# Patient Record
Sex: MALE | Race: WHITE | URBAN - NONMETROPOLITAN AREA
[De-identification: names, ages, dates, MRNs, and addresses within clinical notes are randomized per-mention and may not be internally consistent; named-entity substitution may affect disease eponyms.]

---

## 2020-06-15 ENCOUNTER — TELEPHONE (OUTPATIENT)
Dept: FAMILY MEDICINE CLINIC | Age: 40
End: 2020-06-15

## 2020-06-17 NOTE — PROGRESS NOTES
Outpatient Medications   Medication Sig Dispense Refill    ciprofloxacin (CIPRO) 500 MG tablet take 1 tablet by mouth twice a day      metroNIDAZOLE (FLAGYL) 500 MG tablet take 1 tablet by mouth twice a day for 7 days      docusate sodium (COLACE) 100 MG capsule Take 100 mg by mouth 2 times daily OTC      ibuprofen (ADVIL;MOTRIN) 200 MG CAPS Take 1 capsule by mouth as needed for Fever OTC       No current facility-administered medications for this visit. No orders of the defined types were placed in this encounter. All medications reviewed and reconciled, including OTC and herbal medications. Updated list given to patient. There are no active problems to display for this patient. Past Medical History:   Diagnosis Date    DDD (degenerative disc disease), lumbar          Past Surgical History:   Procedure Laterality Date    BACK SURGERY  2004    L4 Laminectomy. Dr. Norma Carcamo         No Known Allergies      Social History     Tobacco Use    Smoking status: Never Smoker    Smokeless tobacco: Never Used   Substance Use Topics    Alcohol use: Yes     Frequency: Monthly or less         Family History   Problem Relation Age of Onset    Diabetes Father     Colon Cancer Neg Hx     Prostate Cancer Neg Hx          I have reviewed the patient's past medical history, past surgical history, allergies, medications, social and family history and I have made updates where appropriate.       Review of Systems  Positive responses are highlighted in bold    Constitutional:  Fever, Chills, Night Sweats, Fatigue, Unexpected changes in weight  Eyes:  Eye discharge, Eye pain, Eye redness, Visual disturbances   HENT:  Ear pain, Tinnitus, Nosebleeds, Trouble swallowing, Hearing loss, Sore throat  Cardiovascular:  Chest Pain, Palpitations, Orthopnea, Paroxysmal Nocturnal Dyspnea  Respiratory:  Cough, Wheezing, Shortness of breath, Chest tightness, Apnea  Gastrointestinal:  Nausea, Vomiting, Diarrhea, Constipation, or gross deformity   Neuro:  Alert, 5/5 strength globally and symmetrically, 2+ patellar reflexes bilaterally  Psych: Affect appropriate. Mood normal. Thought process is normal without evidence of depression or psychosis. Good insight and appropriate interaction. Cognition and memory appear to be intact.   Skin: warm and dry, no rash or erythema  Lymph:  No cervical, auricular or supraclavicular lymph nodes palpated      Orders Only on 06/15/2020   Component Date Value Ref Range Status    Color, UA 06/15/2020 Straw   Final    Appearance 06/15/2020 Clear   Final    Glucose, Ur 06/15/2020 Negative  Negative Final    Bilirubin Urine 06/15/2020 Negative  Negative Final    Ketones, Urine 06/15/2020 Negative  Negative Final    Specific Gravity, Urine 06/15/2020 1.025  1.000 - 1.03 Final    Urine Hgb 06/15/2020 Negative  Negative Final    pH, Urine 06/15/2020 7.0  5.0 - 8.0 Final    Protein, Urine 06/15/2020 Negative  Negative Final    Urobilinogen, Urine 06/15/2020 <2.0 E.U./dL  0.2 - 1.0 Final    Nitrite, Urine 06/15/2020 Negative  Negative Final    LEUKOCYTES, UA 06/15/2020 Negative  Negative Final    WBC, UA 06/15/2020 0-5  /HPF Final    RBC, UA 06/15/2020 None  /HPF Final    Squam Epithel, UA 06/15/2020 None  /HPF Final    URINALYSIS REFLEX 06/15/2020 No   Final    Blood Type 06/15/2020 B Negative   Final    Ab Scrn 06/15/2020 NEGATIVE   Final    Lipase 06/15/2020 35  21 - 51 IU/L Final    Sodium 06/15/2020 140  135 - 145 mEq/L Final    Potassium 06/15/2020 3.5* 3.6 - 5.0 mEq/L Final    Chloride 06/15/2020 102  101 - 111 mEq/L Final    CO2 06/15/2020 27  21 - 32 mEq/L Final    Anion Gap 06/15/2020 11  4 - 12 Final    Glucose 06/15/2020 96  70 - 110 mg/dL Final    BUN 06/15/2020 14  7 - 20 mg/dL Final    CREATININE 06/15/2020 1.13  0.60 - 1.30 mg/dL Final    Creatinine Clearance 06/15/2020 >60   Final    AST 06/15/2020 16  15 - 41 IU/L Final    Alkaline Phosphatase 06/15/2020 55  41 - 137 IU/L Final    Total Bilirubin 06/15/2020 0.7  0.2 - 1.0 mg/dL Final    Calcium 06/15/2020 9.20  8.8 - 10.5 mg/dL Final    Alb 06/15/2020 4.3  3.5 - 5.0 gm/dL Final    Total Protein 06/15/2020 7.2  6.2 - 8.0 g/dL Final    Albumin/Globulin Ratio 06/15/2020 1.5  1.5 - 2.5 Final    ALT 06/15/2020 24  10 - 40 IU/L Final    Protime 06/15/2020 12.3  9.6 - 13.3 Sec Final    INR 06/15/2020 1.07  0.9 - 1.2 Final    aPTT 06/15/2020 31.0  23.0 - 38.0 Sec Final    WBC 06/15/2020 8.4  4.4 - 10.5 th/cmm Final    RBC 06/15/2020 5.33  4.50 - 6.00 mil/cmm Final    Hemoglobin 06/15/2020 15.3  13.5 - 16.5 gm/dL Final    Hematocrit 06/15/2020 44.9  40.0 - 49.0 % Final    MCV 06/15/2020 84.2  80 - 97 CU SUZY Final    MCH 06/15/2020 28.7  27.5 - 33.0 PG Final    MCHC 06/15/2020 34.1  33.0 - 36.0 gm/dL Final    RDW 06/15/2020 13.9  12.0 - 16.0 % Final    Platelets  280  150 - 400 th/cmm Final    Neutrophils % 06/15/2020 64.4  40 - 70 % Final    Lymphocytes % 06/15/2020 25.3  15 - 45 % Final    Monocytes % 06/15/2020 9.1  2 - 10 % Final    Eosinophils % 06/15/2020 0.9  0 - 6 % Final    Basophils % 06/15/2020 0.3  0 - 2 % Final    Nucleated RBCs 06/15/2020 0.1  <1 /100 WBC Final    Absolute Neut # 06/15/2020 5400  1800 - 7700 /cmm Final    Absolute Lymph # 06/15/2020 2100  1000 - 4800 /cmm Final    Absolute Mono # 06/15/2020 800  0 - 800 /cmm Final    Absolute Eos # 06/15/2020 100  0 - 500 /cmm Final    Absolute Baso # 06/15/2020 0  0 - 200 /cmm Final       Narrative   Ordering Provider: [address] [place] [address]          Radiology Department  Patient:  JAYLA SHORT 81.   :  1980   Sex: [facility]   [place] [facility] [place]     [phone]   Unit #:   [id number]       Acct #: [de-identified]       Ordering Phys: Anitra Silva PA-C            Exam Date: 06/15/20     Accession #:  [id number]     Exam:  CT   CT Abd/Pelvis W/Contrast [id number]     Result: See  Hepatic steatosis.     3.  Hepatic hypodensity, too small to characterize.     4.  Old granulomatous disease.          Electronically Signed:     Krishna Martinez MD     2020/06/15 at 18:22 EDT     Tel [phone], Service support  [phone], Fax [phone]                         cc: Wilfred Valadez PA-C; Katelyn Zhu DO               Dictated by: Andria Boo MD on 06/15/20 [employer]     Technologist: Xavi Sanon RT(R)(CT)(ARRT)     Transcribed by: Andria Boo MD on 06/15/20 1822          Report Signed by: Dax Gallardo MD,Ailin MULLIGAN on 06/15/20 1822         ASSESSMENT & PLAN  1. Visit for preventive health examination    Vaccines reviewed and update recommendations made  Routine labs ordered  F/u annually and prn    - Basic Metabolic Panel; Future  - Lipid Panel; Future    2. Colitis    Improving  Finish ATB  F/u GI  Reviewed ER precautions, pt understands. Near syncope likely vasovagal. Never happened before or since  Monitor for recurrence      DISPOSITION    Return in about 1 year (around 6/19/2021) for routine well visit, sooner as needed. Bell Bro released without restrictions. PATIENT COUNSELING    Counseling was provided today regarding the following topics: Healthy eating habits, Regular exercise, substance abuse and healthy sleep habits. Bell Bro received counseling on the following healthy behaviors: nutrition, exercise and medication adherence    Patient given educational materials on: See Attached    I have instructed Bell Bro to complete a self tracking handout on We[employer] and instructed them to bring it with them to his next appointment. Barriers to learning and self management: none    Discussed use, benefit, and side effects of prescribed medications. Barriers to medication compliance addressed. All patient questions answered. Pt voiced understanding.        Electronically signed by Katelyn Zhu DO on 6/19/2020 at 11:21 AM

## 2020-06-19 ENCOUNTER — OFFICE VISIT (OUTPATIENT)
Dept: FAMILY MEDICINE CLINIC | Age: 40
End: 2020-06-19
Payer: COMMERCIAL

## 2020-06-19 VITALS
SYSTOLIC BLOOD PRESSURE: 118 MMHG | DIASTOLIC BLOOD PRESSURE: 78 MMHG | TEMPERATURE: 97.8 F | HEART RATE: 81 BPM | WEIGHT: 200 LBS | HEIGHT: 70 IN | RESPIRATION RATE: 10 BRPM | BODY MASS INDEX: 28.63 KG/M2 | OXYGEN SATURATION: 97 %

## 2020-06-19 PROCEDURE — 99386 PREV VISIT NEW AGE 40-64: CPT | Performed by: FAMILY MEDICINE

## 2020-06-19 RX ORDER — METRONIDAZOLE 500 MG/1
TABLET ORAL
COMMUNITY
Start: 2020-06-15 | End: 2020-08-25 | Stop reason: SDUPTHER

## 2020-06-19 RX ORDER — OMEGA-3 FATTY ACIDS/FISH OIL 300-1000MG
1 CAPSULE ORAL PRN
COMMUNITY

## 2020-06-19 RX ORDER — CIPROFLOXACIN 500 MG/1
TABLET, FILM COATED ORAL
COMMUNITY
Start: 2020-06-15 | End: 2020-08-25 | Stop reason: SDUPTHER

## 2020-06-19 RX ORDER — DOCUSATE SODIUM 100 MG/1
100 CAPSULE, LIQUID FILLED ORAL 2 TIMES DAILY
COMMUNITY

## 2020-06-19 SDOH — HEALTH STABILITY: MENTAL HEALTH: HOW OFTEN DO YOU HAVE A DRINK CONTAINING ALCOHOL?: MONTHLY OR LESS

## 2020-06-19 ASSESSMENT — PATIENT HEALTH QUESTIONNAIRE - PHQ9
1. LITTLE INTEREST OR PLEASURE IN DOING THINGS: 0
2. FEELING DOWN, DEPRESSED OR HOPELESS: 0
SUM OF ALL RESPONSES TO PHQ QUESTIONS 1-9: 0
SUM OF ALL RESPONSES TO PHQ QUESTIONS 1-9: 0
SUM OF ALL RESPONSES TO PHQ9 QUESTIONS 1 & 2: 0

## 2020-06-25 ENCOUNTER — TELEPHONE (OUTPATIENT)
Dept: FAMILY MEDICINE CLINIC | Age: 40
End: 2020-06-25

## 2020-06-26 ENCOUNTER — TELEPHONE (OUTPATIENT)
Dept: FAMILY MEDICINE CLINIC | Age: 40
End: 2020-06-26

## 2020-08-25 ENCOUNTER — OFFICE VISIT (OUTPATIENT)
Dept: FAMILY MEDICINE CLINIC | Age: 40
End: 2020-08-25
Payer: COMMERCIAL

## 2020-08-25 ENCOUNTER — PATIENT MESSAGE (OUTPATIENT)
Dept: FAMILY MEDICINE CLINIC | Age: 40
End: 2020-08-25

## 2020-08-25 VITALS
HEART RATE: 78 BPM | SYSTOLIC BLOOD PRESSURE: 136 MMHG | OXYGEN SATURATION: 98 % | WEIGHT: 196 LBS | RESPIRATION RATE: 10 BRPM | BODY MASS INDEX: 28.06 KG/M2 | HEIGHT: 70 IN | TEMPERATURE: 98.3 F | DIASTOLIC BLOOD PRESSURE: 70 MMHG

## 2020-08-25 PROCEDURE — 99214 OFFICE O/P EST MOD 30 MIN: CPT | Performed by: FAMILY MEDICINE

## 2020-08-25 RX ORDER — CIPROFLOXACIN 500 MG/1
500 TABLET, FILM COATED ORAL 2 TIMES DAILY
Qty: 20 TABLET | Refills: 0 | Status: SHIPPED | OUTPATIENT
Start: 2020-08-25 | End: 2020-09-04

## 2020-08-25 RX ORDER — METRONIDAZOLE 500 MG/1
500 TABLET ORAL 2 TIMES DAILY
Qty: 20 TABLET | Refills: 0 | Status: SHIPPED | OUTPATIENT
Start: 2020-08-25 | End: 2020-09-04

## 2020-08-25 NOTE — PATIENT INSTRUCTIONS
Patient Education        Colitis: Care Instructions  Your Care Instructions  Colitis is the medical term for swelling (inflammation) of the intestine. It can be caused by different things, such as an infection or loss of blood flow in the intestine. Other causes are problems like Crohn's disease or ulcerative colitis. Symptoms may include fever, diarrhea that may be bloody, or belly pain. Sometimes symptoms go away without treatment. But you may need treatment or more tests, such as blood tests or a stool test. Or you may need imaging tests like a CT scan or a colonoscopy. In some cases, the doctor may want to test a sample of tissue from the intestine. This test is called a biopsy. The doctor has checked you carefully, but problems can develop later. If you notice any problems or new symptoms, get medical treatment right away. Follow-up care is a key part of your treatment and safety. Be sure to make and go to all appointments, and call your doctor if you are having problems. It's also a good idea to know your test results and keep a list of the medicines you take. How can you care for yourself at home? · Rest until you feel better. · Your doctor may recommend that you eat bland foods. These include rice, dry toast or crackers, bananas, and applesauce. · To prevent dehydration, drink plenty of fluids. Choose water and other caffeine-free clear liquids until you feel better. If you have kidney, heart, or liver disease and have to limit fluids, talk with your doctor before you increase the amount of fluids you drink. · Be safe with medicines. Take your medicines exactly as prescribed. Call your doctor if you think you are having a problem with your medicine. You will get more details on the specific medicines your doctor prescribes. When should you call for help? [id number] anytime you think you may need emergency care. For example, call if:  · You passed out (lost consciousness).   · Your stools are maroon or very bloody. Call your doctor now or seek immediate medical care if:  · You have new or worse belly pain. · You have a fever. · You are vomiting. · You cannot pass stools or gas. · You have new or more blood in your stools. Watch closely for changes in your health, and be sure to contact your doctor if:  · You have new or worse symptoms. · You are losing weight. · You do not get better as expected. Where can you learn more? Go to [email or web address] and sign in to your [employer] account. Enter S411 in the [facility] box to learn more about \"Colitis: Care Instructions. \"     If you do not have an account, please click on the \"Sign Up Now\" link. Current as of: August 12, 2019               Content Version: 12.5  © 6286-3113 Healthwise, Incorporated. Care instructions adapted under license by [facility] ([facility]). If you have questions about a medical condition or this instruction, always ask your healthcare professional. [address] any warranty or liability for your use of this information.

## 2020-08-25 NOTE — PROGRESS NOTES
Chief Complaint   Patient presents with    Abdominal Pain     cramping started 2 days ago - still has bloody stools  and  watery loose stools        History obtained from the patient. SUBJECTIVE:  Nadeen Hunt is a 38 y.o. male that presents today for       -GI issues LAST VISIT:  Last weekend had severe abd pain  Was on the toilet, and pain was so bad when trying to have a BM and almost passed out  Didn't last long  Did not fall  Was then able to have a BM, had blood diarrhea  Did not seek care at that time. Continued to feel bloated, dull lower abd pain, and blood stool  Seen in ER Monday, [address]  Had labs/ct  Dx collitis  Placed on cipro/flagyl and sent home  Has had several episodes of abd pain over the last 8 years. Never had blood stools. No fam hx of UC or Chron's dz  Saw GI yesterday. Fay scheduled for July  Is overall feeling better  No further blood in stool  abd pain improving    UPDATE TODAY:   Did fine with no sxs until 2 days ago  Started with mid to lower abd, cramping and some bloody stools  C/w previous colitis    Saw GI in July with neg colo then other than some polyps     No recent ATB use or travel    Pain not as bad as when in ER in June   No fevers  No N/V  Missed work today  Pain is crampy, mid to lower abd, worse in LLQ  Better with rest, worse if eats  4/10 at it's worst      Age/Gender Health Maintenance    Lipid -   No results found for: CHOL  No results found for: TRIG  No results found for: HDL  No results found for: LDLCHOLESTEROL, LDLCALC  No results found for: LABVLDL, VLDL  No results found for: [facility]    DM Screen -   Lab Results   Component Value Date    GLUCOSE 96 06/15/2020       Colon Cancer Screening - + Polyps July 2020, repeat 3 years per GI, Areli Ramos.   Lung Cancer Screening (Age 54 to [de-identified] with 30 pack year hx, current smoker or quit within past 15 years) - n/a    Tetanus - UTD July 2018  Influenza Vaccine - Candidate FALL 2020  Pneumonia Vaccine - age 72  Zoster - age 48     PSA testing discussion - age 54  AAA Screening - age 72 if meets criteria    Falls screening - n/a      Current Outpatient Medications   Medication Sig Dispense Refill    ciprofloxacin (CIPRO) 500 MG tablet Take 1 tablet by mouth 2 times daily for 10 days 20 tablet 0    metroNIDAZOLE (FLAGYL) 500 MG tablet Take 1 tablet by mouth 2 times daily for 10 days 20 tablet 0    docusate sodium (COLACE) 100 MG capsule Take 100 mg by mouth 2 times daily OTC      ibuprofen (ADVIL;MOTRIN) 200 MG CAPS Take 1 capsule by mouth as needed for Fever OTC       No current facility-administered medications for this visit. Orders Placed This Encounter   Medications    ciprofloxacin (CIPRO) 500 MG tablet     Sig: Take 1 tablet by mouth 2 times daily for 10 days     Dispense:  20 tablet     Refill:  0    metroNIDAZOLE (FLAGYL) 500 MG tablet     Sig: Take 1 tablet by mouth 2 times daily for 10 days     Dispense:  20 tablet     Refill:  0       All medications reviewed and reconciled, including OTC and herbal medications. Updated list given to patient. There are no active problems to display for this patient. Past Medical History:   Diagnosis Date    DDD (degenerative disc disease), lumbar          Past Surgical History:   Procedure Laterality Date    BACK SURGERY  2004    L4 Laminectomy. Dr. Marc Chavez         No Known Allergies      Social History     Tobacco Use    Smoking status: Never Smoker    Smokeless tobacco: Never Used   Substance Use Topics    Alcohol use: Yes     Frequency: Monthly or less         Family History   Problem Relation Age of Onset    Diabetes Father     Colon Cancer Neg Hx     Prostate Cancer Neg Hx          I have reviewed the patient's past medical history, past surgical history, allergies, medications, social and family history and I have made updates where appropriate.       Review of Systems  Positive responses are highlighted in bold    Constitutional:  Fever, Chills, Night Sweats, Fatigue, Unexpected changes in weight  HENT:  Ear pain, Tinnitus, Nosebleeds, Trouble swallowing, Hearing loss, Sore throat  Cardiovascular:  Chest Pain, Palpitations, Orthopnea, Paroxysmal Nocturnal Dyspnea  Respiratory:  Cough, Wheezing, Shortness of breath, Chest tightness, Apnea  Gastrointestinal:  Nausea, Vomiting, Diarrhea, Constipation, Heartburn, Blood in stool  Genitourinary:  Difficulty or painful urination, Flank pain, Change in frequency, Urgency  Skin:  Color change, Rash, Itching, Wound  Musculoskeletal:  Joint pain, Back pain, Gait problems, Joint swelling, Myalgias  Neurological:  Dizziness, Headaches, Presyncope, Numbness, Seizures, Tremors  Endocrine:  Heat Intolerance, Cold Intolerance, Polydipsia, Polyphagia, Polyuria      PHYSICAL EXAM:  Vitals:    08/25/20 1513   BP: 136/70   Pulse: 78   Resp: 10   Temp: 98.3 °F (36.8 °C)   TempSrc: Oral   SpO2: 98%   Weight: 196 lb (88.9 kg)   Height: 5' 9.5\" (1.765 m)     Body mass index is 28.53 kg/m². Pain Score:   3(abdomen pain)    VS Reviewed  General Appearance: A&O x 3, No acute distress,well developed and well- nourished  Eyes: pupils equal, round, and reactive to light, extraocular eye movements intact, conjunctivae and eye lids without erythema  ENT: external ear and ear canal clear bilaterally, TMs intact and regular, nose without deformity, nasal mucosa and turbinates normal without polyps, oropharynx normal, dentition is normal for age  Neck: supple and non-tender without mass, no thyromegaly or thyroid nodules, no cervical lymphadenopathy  Pulmonary/Chest: clear to auscultation bilaterally- no wheezes, rales or rhonchi, normal air movement, no respiratory distress or retractions  Cardiovascular: S1 and S2 auscultated w/ RRR. No murmurs, rubs, clicks, or gallops, distal pulses intact. Abdomen: soft, TTP lower abdomen, worse in LLD w/o guarding, rebound or rigidity.  Non-distended, bowel sounds physiologic,  no rebound or guarding, no masses or hernias noted. Liver and spleen without enlargement. Extremities: no cyanosis, clubbing or edema of the lower extremities. Skin: warm and dry, no rash or erythema      ASSESSMENT & PLAN  1. Colitis    VSS  Exam reassuring  Colitis vs diverticulitis  No need for acute imaging at this point    Plan:  Fluids, rest, otc pepto  Add cipro/flagyl  F/u with GI next few wks to reassess  Off work until Friday  F/u if no better  Reviewed ER precautions, pt understands. - ciprofloxacin (CIPRO) 500 MG tablet; Take 1 tablet by mouth 2 times daily for 10 days  Dispense: 20 tablet; Refill: 0  - metroNIDAZOLE (FLAGYL) 500 MG tablet; Take 1 tablet by mouth 2 times daily for 10 days  Dispense: 20 tablet; Refill: 0      DISPOSITION    Return if symptoms worsen or fail to improve. Simin Arevalo released with work restrictions    PATIENT COUNSELING    Patient given educational materials on: See Attached    Barriers to learning and self management: none    Discussed use, benefit, and side effects of prescribed medications. Barriers to medication compliance addressed. All patient questions answered. Pt voiced understanding.        Electronically signed by Chan Hunter DO on 8/25/2020 at 3:28 PM

## 2021-04-01 ENCOUNTER — TELEPHONE (OUTPATIENT)
Dept: FAMILY MEDICINE CLINIC | Age: 41
End: 2021-04-01

## 2021-04-01 NOTE — TELEPHONE ENCOUNTER
2nd attempt to contact the pt re:[facility] labs Dr Madhavi Braga ordered on 6/19/20. HIPAA form is up to date, orders mailed.

## 2021-05-01 ENCOUNTER — NURSE ONLY (OUTPATIENT)
Dept: LAB | Age: 41
End: 2021-05-01

## 2021-05-01 DIAGNOSIS — Z00.00 VISIT FOR PREVENTIVE HEALTH EXAMINATION: ICD-10-CM

## 2021-05-01 LAB
ANION GAP SERPL CALCULATED.3IONS-SCNC: 9 MEQ/L (ref 8–16)
BUN BLDV-MCNC: 10 MG/DL (ref 7–22)
CALCIUM SERPL-MCNC: 9 MG/DL (ref 8.5–10.5)
CHLORIDE BLD-SCNC: 107 MEQ/L (ref 98–111)
CHOLESTEROL, TOTAL: 135 MG/DL (ref 100–199)
CO2: 27 MEQ/L (ref 23–33)
CREAT SERPL-MCNC: 1.1 MG/DL (ref 0.4–1.2)
GFR SERPL CREATININE-BSD FRML MDRD: 73 ML/MIN/1.73M2
GLUCOSE BLD-MCNC: 92 MG/DL (ref 70–108)
HDLC SERPL-MCNC: 41 MG/DL
LDL CHOLESTEROL CALCULATED: 80 MG/DL
POTASSIUM SERPL-SCNC: 4.6 MEQ/L (ref 3.5–5.2)
SODIUM BLD-SCNC: 143 MEQ/L (ref 135–145)
TRIGL SERPL-MCNC: 69 MG/DL (ref 0–199)

## 2021-05-03 ENCOUNTER — TELEPHONE (OUTPATIENT)
Dept: FAMILY MEDICINE CLINIC | Age: 41
End: 2021-05-03

## 2021-05-03 NOTE — TELEPHONE ENCOUNTER
----- Message from Hua Dempsey DO sent at 5/2/2021 10:50 AM EDT -----  Please let pt know that labs are stable and appropriate. Lipids look good. No concerning findings. Let me know if questions, thanks!

## 2022-01-27 ENCOUNTER — VIRTUAL VISIT (OUTPATIENT)
Dept: FAMILY MEDICINE CLINIC | Age: 42
End: 2022-01-27
Payer: COMMERCIAL

## 2022-01-27 VITALS — RESPIRATION RATE: 12 BRPM

## 2022-01-27 DIAGNOSIS — U07.1 COVID-19: Primary | ICD-10-CM

## 2022-01-27 PROCEDURE — 99213 OFFICE O/P EST LOW 20 MIN: CPT | Performed by: FAMILY MEDICINE

## 2022-01-27 RX ORDER — MULTIVIT WITH MINERALS/LUTEIN
1000 TABLET ORAL DAILY
Qty: 30 TABLET | Refills: 0 | Status: SHIPPED | OUTPATIENT
Start: 2022-01-27 | End: 2022-02-26

## 2022-01-27 RX ORDER — CHOLECALCIFEROL (VITAMIN D3) 50 MCG
2000 TABLET ORAL DAILY
Qty: 30 TABLET | Refills: 0 | Status: SHIPPED | OUTPATIENT
Start: 2022-01-27 | End: 2022-02-26

## 2022-01-27 NOTE — PROGRESS NOTES
Chief Complaint   Patient presents with    Positive For Covid-19       TELEHEALTH EVALUATION -- Audio/Visual (During GLCSB-56 public health emergency)    Due to COVID 19 outbreak, patient's office visit was converted to a virtual visit. Patient (and/or legal guardian) was contacted and agreed to proceed with a virtual visit via [email or web address] me  The risks and benefits of converting to a virtual visit were discussed in light of the current infectious disease epidemic. Patient (and/or legal guardian) also understood that insurance coverage and co-pays are up to their individual insurance plans. Services were provided through a video synchronous discussion virtually to substitute for in-person clinic visit    Location of patient: home  Location of physician: office    Identification confirmed by: Patient : 1980    Nathen Pineda, was evaluated through a synchronous (real-time) audio-video encounter. The patient (or guardian if applicable) is aware that this is a billable service, which includes applicable co-pays. This Virtual Visit was conducted with patient's (and/or legal guardian's) consent. The visit was conducted pursuant to the emergency declaration under the [facility], [address] [place] [address] waiver authority and the [employer] and [employer] Act. Patient identification was verified, and a caregiver was present when appropriate. The patient was located in a state where the provider was licensed to provide care. he patient (and/or legal guardian) has also been advised to contact this office for worsening conditions or problems, and seek emergency medical treatment and/or call 911 if deemed necessary. Services were provided through a video synchronous discussion virtually to substitute for in-person clinic visit. Due to this being a TeleHealth encounter, evaluation of certain organ systems is limited.       History obtained from the patient. SUBJECTIVE:  Jose A Mera is a 36 y.o. male that presents today for       -COVID f/u:  sxs started: 1/26  Dx: 1/27 with rapid test  Current sxs: mild cough, nasal drainage, fever 101.2 at tmax, down now. Has a headache, body aches and chills. No SOB. Unvaccinated. He thinks may have had covid before, but never was tested. Age/Gender Health Maintenance    Lipid -   Lab Results   Component Value Date    CHOL 135 05/01/2021     Lab Results   Component Value Date    TRIG 69 05/01/2021     Lab Results   Component Value Date    HDL 41 05/01/2021     Lab Results   Component Value Date    LDLCALC 80 05/01/2021     No results found for: LABVLDL, VLDL  No results found for: CHOLHDLRATIO    DM Screen -   Lab Results   Component Value Date    GLUCOSE 92 05/01/2021    GLUCOSE 96 06/15/2020       Colon Cancer Screening - + Polyps July 2020, repeat 3 years per GI, Atif Casas. Lung Cancer Screening (Age 54 to [de-identified] with 27 pack year hx, current smoker or quit within past 15 years) - n/a    Tetanus - UTD July 2018  Influenza Vaccine - Candidate FALL 2021  Pneumonia Vaccine - age 72  Zoster - age 47     PSA testing discussion - age 53  AAA Screening - age 72 if meets criteria    Falls screening - n/a      Current Outpatient Medications   Medication Sig Dispense Refill    Ascorbic Acid (VITAMIN C) 1000 MG tablet Take 1 tablet by mouth daily 30 tablet 0    vitamin D (CHOLECALCIFEROL) 50 MCG (2000 UT) TABS tablet Take 1 tablet by mouth daily 30 tablet 0    zinc 50 MG CAPS Take 50 mg by mouth daily 30 capsule 0    docusate sodium (COLACE) 100 MG capsule Take 100 mg by mouth 2 times daily OTC      ibuprofen (ADVIL;MOTRIN) 200 MG CAPS Take 1 capsule by mouth as needed for Fever OTC       No current facility-administered medications for this visit.      Orders Placed This Encounter   Medications    Ascorbic Acid (VITAMIN C) 1000 MG tablet     Sig: Take 1 tablet by mouth daily     Dispense:  30 tablet     Refill:  0  vitamin D (CHOLECALCIFEROL) 50 MCG (2000 UT) TABS tablet     Sig: Take 1 tablet by mouth daily     Dispense:  30 tablet     Refill:  0    zinc 50 MG CAPS     Sig: Take 50 mg by mouth daily     Dispense:  30 capsule     Refill:  0       All medications reviewed and reconciled, including OTC and herbal medications. Updated list given to patient. There are no problems to display for this patient. Past Medical History:   Diagnosis Date    DDD (degenerative disc disease), lumbar          Past Surgical History:   Procedure Laterality Date    BACK SURGERY  2004    L4 Laminectomy. Dr. Zulema Dowell         No Known Allergies      Social History     Tobacco Use    Smoking status: Never Smoker    Smokeless tobacco: Never Used   Substance Use Topics    Alcohol use: Yes         Family History   Problem Relation Age of Onset    Diabetes Father     Colon Cancer Neg Hx     Prostate Cancer Neg Hx          I have reviewed the patient's past medical history, past surgical history, allergies, medications, social and family history and I have made updates where appropriate.       Review of Systems  Positive responses are highlighted in bold    Constitutional:  Fever, Chills, Night Sweats, Fatigue, Unexpected changes in weight  HENT:  Ear pain, Tinnitus, Nosebleeds, Trouble swallowing, Hearing loss, Sore throat  Cardiovascular:  Chest Pain, Palpitations, Orthopnea, Paroxysmal Nocturnal Dyspnea  Respiratory:  Cough, Wheezing, Shortness of breath, Chest tightness, Apnea  Gastrointestinal:  Nausea, Vomiting, Diarrhea, Constipation, Heartburn, Blood in stool  Genitourinary:  Difficulty or painful urination, Flank pain, Change in frequency, Urgency  Skin:  Color change, Rash, Itching, Wound  Musculoskeletal:  Joint pain, Back pain, Gait problems, Joint swelling, Myalgias  Neurological:  Dizziness, Headaches, Presyncope, Numbness, Seizures, Tremors  Endocrine:  Heat Intolerance, Cold Intolerance, Polydipsia, Polyphagia, Polyuria      PHYSICAL EXAM:  Not all vitals able to be obtained, video visit  Patient-Reported Vitals 1/27/2022   Patient-Reported Pulse 74   Patient-Reported Temperature 98.6      Vitals:    01/27/22 1345   Resp: 12          General Appearance: A&O x 3, No acute distress,well developed and well- nourished  Head: normocephalic and atraumatic  Eyes: pupils equal, round, and reactive to light, extraocular eye movements intact, conjunctivae and eye lids without erythema  ENT: External ears w/o redness, external nares without redness or discharge. Hearing is intact. Lips are w/o lesion. Teeth are in good repair. Pulmonary/Chest: normal respiratory effort. Normal respiratory rate. No respiratory distress . Skin: warm and dry, no rash or erythema to visible areas. Psych: Affect appropriate. Mood euthymic. Thought process is normal without evidence of depression or psychosis. Good insight and appropriate interaction. Cognition and memory appear to be intact. ASSESSMENT & PLAN  1. COVID-19    + covid  Mild sxs thus far  Low risk based on age etc  Monoclonal ATB nor antiviral treatments readily available at present time  Discussed Luvox per [facility] OP guidelines, but pt declines  Isolate at home per [facility]/CDC. If meets criteria, may RTW 01 FEB 2022. Fluids, rest, tylenol  F/u if no better  Reviewed ER precautions, pt understands. - Ascorbic Acid (VITAMIN C) 1000 MG tablet; Take 1 tablet by mouth daily  Dispense: 30 tablet; Refill: 0  - vitamin D (CHOLECALCIFEROL) 50 MCG (2000 UT) TABS tablet; Take 1 tablet by mouth daily  Dispense: 30 tablet; Refill: 0  - zinc 50 MG CAPS; Take 50 mg by mouth daily  Dispense: 30 capsule; Refill: 0      DISPOSITION    Return if symptoms worsen or fail to improve. [place] released with restrictions. No future appointments.   PATIENT COUNSELING    Counseling was provided today regarding the following topics: Healthy eating habits, Regular exercise, substance abuse and healthy sleep habits. Barriers to learning and self management: none    Discussed use, benefit, and side effects of prescribed medications. Barriers to medication compliance addressed. All patient questions answered. Pt voiced understanding.        Electronically signed by Hammad May DO on 1/27/2022 at 1:50 PM

## 2023-06-05 ENCOUNTER — E-VISIT (OUTPATIENT)
Dept: FAMILY MEDICINE CLINIC | Age: 43
End: 2023-06-05
Payer: COMMERCIAL

## 2023-06-05 DIAGNOSIS — R68.89 FLU-LIKE SYMPTOMS: Primary | ICD-10-CM

## 2023-06-05 PROCEDURE — 99421 OL DIG E/M SVC 5-10 MIN: CPT | Performed by: FAMILY MEDICINE

## 2023-06-05 ASSESSMENT — LIFESTYLE VARIABLES: SMOKING_STATUS: NO, I HAVE NEVER SMOKED
